# Patient Record
Sex: FEMALE | Race: WHITE | NOT HISPANIC OR LATINO | Employment: OTHER | URBAN - METROPOLITAN AREA
[De-identification: names, ages, dates, MRNs, and addresses within clinical notes are randomized per-mention and may not be internally consistent; named-entity substitution may affect disease eponyms.]

---

## 2022-02-15 PROBLEM — N90.89 VULVAR LESION: Status: ACTIVE | Noted: 2022-02-15

## 2022-02-24 PROBLEM — N90.89 VULVAR LESION: Status: RESOLVED | Noted: 2022-02-15 | Resolved: 2022-02-24

## 2024-01-23 PROBLEM — Z91.89 HIGH RISK FOR HIP FRACTURE: Status: ACTIVE | Noted: 2024-01-23

## 2024-04-30 ENCOUNTER — OFFICE VISIT (OUTPATIENT)
Dept: RHEUMATOLOGY | Facility: CLINIC | Age: 69
End: 2024-04-30
Payer: MEDICARE

## 2024-04-30 VITALS
HEART RATE: 71 BPM | SYSTOLIC BLOOD PRESSURE: 116 MMHG | HEIGHT: 66 IN | WEIGHT: 165.13 LBS | BODY MASS INDEX: 26.54 KG/M2 | DIASTOLIC BLOOD PRESSURE: 80 MMHG

## 2024-04-30 DIAGNOSIS — M85.80 OSTEOPENIA WITH HIGH RISK OF FRACTURE: Primary | ICD-10-CM

## 2024-04-30 DIAGNOSIS — Z91.89 HIGH RISK FOR HIP FRACTURE: ICD-10-CM

## 2024-04-30 PROCEDURE — 1101F PT FALLS ASSESS-DOCD LE1/YR: CPT | Mod: CPTII,S$GLB,, | Performed by: STUDENT IN AN ORGANIZED HEALTH CARE EDUCATION/TRAINING PROGRAM

## 2024-04-30 PROCEDURE — 3288F FALL RISK ASSESSMENT DOCD: CPT | Mod: CPTII,S$GLB,, | Performed by: STUDENT IN AN ORGANIZED HEALTH CARE EDUCATION/TRAINING PROGRAM

## 2024-04-30 PROCEDURE — 3079F DIAST BP 80-89 MM HG: CPT | Mod: CPTII,S$GLB,, | Performed by: STUDENT IN AN ORGANIZED HEALTH CARE EDUCATION/TRAINING PROGRAM

## 2024-04-30 PROCEDURE — 3008F BODY MASS INDEX DOCD: CPT | Mod: CPTII,S$GLB,, | Performed by: STUDENT IN AN ORGANIZED HEALTH CARE EDUCATION/TRAINING PROGRAM

## 2024-04-30 PROCEDURE — 99205 OFFICE O/P NEW HI 60 MIN: CPT | Mod: S$GLB,,, | Performed by: STUDENT IN AN ORGANIZED HEALTH CARE EDUCATION/TRAINING PROGRAM

## 2024-04-30 PROCEDURE — 1126F AMNT PAIN NOTED NONE PRSNT: CPT | Mod: CPTII,S$GLB,, | Performed by: STUDENT IN AN ORGANIZED HEALTH CARE EDUCATION/TRAINING PROGRAM

## 2024-04-30 PROCEDURE — 99999 PR PBB SHADOW E&M-EST. PATIENT-LVL V: CPT | Mod: PBBFAC,,, | Performed by: STUDENT IN AN ORGANIZED HEALTH CARE EDUCATION/TRAINING PROGRAM

## 2024-04-30 PROCEDURE — 1159F MED LIST DOCD IN RCRD: CPT | Mod: CPTII,S$GLB,, | Performed by: STUDENT IN AN ORGANIZED HEALTH CARE EDUCATION/TRAINING PROGRAM

## 2024-04-30 PROCEDURE — 1160F RVW MEDS BY RX/DR IN RCRD: CPT | Mod: CPTII,S$GLB,, | Performed by: STUDENT IN AN ORGANIZED HEALTH CARE EDUCATION/TRAINING PROGRAM

## 2024-04-30 PROCEDURE — 3074F SYST BP LT 130 MM HG: CPT | Mod: CPTII,S$GLB,, | Performed by: STUDENT IN AN ORGANIZED HEALTH CARE EDUCATION/TRAINING PROGRAM

## 2024-04-30 NOTE — PROGRESS NOTES
RHEUMATOLOGY CLINIC INITIAL VISIT    Reason for consult:- osteopenia with high risk of fracture    Chief complaints, HPI, ROS, EXAM, Assessment & Plans:-    Lindsey Pineda is a 68 y.o. pleasant female who presents to be evaluated for osteopenia with high risk of fracture.  She has not had any history of fragility fractures.  Currently not taking calcium or vitamin-D.  She had recent DEXA scan that showed significant decline from previous in 2019.  She does have parental history of hip fracture in her father.  Does not smoke.  Has not been on steroids for prolonged period of time.  Does not have history of falls.  She is concerned about taking bisphosphonates due to her history of jaw surgeries and risk for osteonecrosis of the jaw.  Rheumatologic review of systems otherwise negative.  Physical exam is unremarkable.      Reviewed all available old and outside pertinent medical records.    All lab results personally reviewed and interpreted by me.    1. Osteopenia with high risk of fracture    2. High risk for hip fracture        Problem List Items Addressed This Visit          Orthopedic    High risk for hip fracture    Overview     Ten year hip fracture risk 4%.  Total T-score of hip is low bone mass, normal spine.  NL PTH, Ca,TSH. Vit D low nl          Other Visit Diagnoses       Osteopenia with high risk of fracture    -  Primary            Patient presenting to be evaluated for treatment of osteopenia with high risk of fracture  She had a DEXA scan January 2024 that showed T-score of -1.9 at the left femoral neck  Her FRAX score showed 18% risk of major osteoporotic fracture over the next 10 years and 3.6% risk of hip fracture over the next 10 years  This would be classified as high-risk  She is currently hesitant to begin treatment with bisphosphonates due to risk of ONJ  Long discussion about risks and benefits  While she would be possibly at slightly higher risk for ONJ, still feel the absolute risk would be  relatively low  Counseled on all modifiable risk factors for osteoporosis  Encouraged to obtain 1200 mg of calcium daily and take vitamin D3 5000 IU daily combined with vitamin K2 100 mcg daily  Encouraged weight-bearing exercise  Counseled on fall prevention  Would continue to trend bone density every 2 years and calculate FRAX score including parental history of fracture    # Follow up if symptoms worsen or fail to improve.    Chronic comorbid conditions affecting medical decision making today:    Past Medical History:   Diagnosis Date    Anxiety     Cystocele with rectocele 11/11/2014     grade 1 rectocele present, grade 1 cystocele present     Depression     Diverticulitis     Family history of breast cancer 01/19/2016    PT +BARD1 2016    Genetic carrier     BARD1    Hypercholesteremia     Hypertriglyceridemia--CARDIOLOGIST FOLLOWING    IBS (irritable bowel syndrome) 11/2006    Insulin resistance 09/10/2011    Marked IR with suboptimal 1st phase beta cell response, NOW W PCP    Mitral valve prolapse 11/2006    Osteoarthritis     Osteopenia 2014    Spine    Vitamin D deficiency     PCP FOLLOWING    Vulvar lesion 2/15/2022    Path benign fibroepithelial polyp       Past Surgical History:   Procedure Laterality Date    BREAST CYST EXCISION      COLONOSCOPY  2012, 03/31/2017    BENIGN POLYPS, NORMAL     MAXILLOFACIAL SURGERY      jawbone extended and overbite fixed    OTHER SURGICAL HISTORY      Posterior colporrhaphy for repair of rectocele, with perineorrhaphy if indicated    PRIMO LSO  1986    TONSILLECTOMY          Social History     Tobacco Use    Smoking status: Never     Passive exposure: Never    Smokeless tobacco: Never   Substance Use Topics    Alcohol use: Not Currently    Drug use: Not Currently       Family History   Problem Relation Name Age of Onset    Atrial fibrillation Mother      Heart disease Mother      Prostate cancer Father  70    Heart disease Father      Hypertension Sister      Pancreatic  cancer Brother  40    Heart disease Brother          ISCHEMIC    Diabetes Paternal Grandmother      Breast cancer Paternal Aunt  40       Review of patient's allergies indicates:  No Known Allergies    Medication List with Changes/Refills   Current Medications    ATORVASTATIN (LIPITOR) 40 MG TABLET    Take 40 mg by mouth.    CYANOCOBALAMIN, VITAMIN B-12, (B-12 KIT INJ)    Inject as directed.    EPINEPHRINE (EPIPEN) 0.3 MG/0.3 ML ATIN    INJECT ONE PEN INTRAMUSCULARLY DAILY AS NEEDED FOR ANAPHYLAXIS    ERGOCALCIFEROL, VITAMIN D2, (VITAMIN D ORAL)    Take by mouth.    ESTRADIOL (IMVEXXY MAINTENANCE PACK) 10 MCG INST    INSERT 1 CAPSULE VAGINALLY TWICE WEEKLY AS DIRECTED    IPRATROPIUM (ATROVENT) 42 MCG (0.06 %) NASAL SPRAY    2 sprays 2 (two) times daily.    LINZESS 290 MCG CAP CAPSULE    Take 1 capsule in the morning 30 minutes before breakfast    METFORMIN (GLUCOPHAGE-XR) 500 MG ER 24HR TABLET        MOUNJARO 2.5 MG/0.5 ML PNIJ        PANTOPRAZOLE (PROTONIX) 40 MG TABLET        VENLAFAXINE (EFFEXOR-XR) 75 MG 24 HR CAPSULE             Disclaimer: This note was prepared using voice recognition system and is likely to have sound alike errors and is not proofread.  Please message me with any questions.    62 minutes of total time spent on the encounter, which includes face to face time and non-face to face time preparing to see the patient (eg, review of tests), Obtaining and/or reviewing separately obtained history, Documenting clinical information in the electronic or other health record, Independently interpreting results (not separately reported) and communicating results to the patient/family/caregiver, or Care coordination (not separately reported).     Thank you for allowing me to participate in the care of Lindsey Pineda.    Avni Azar MD